# Patient Record
Sex: FEMALE | Race: WHITE | NOT HISPANIC OR LATINO | URBAN - METROPOLITAN AREA
[De-identification: names, ages, dates, MRNs, and addresses within clinical notes are randomized per-mention and may not be internally consistent; named-entity substitution may affect disease eponyms.]

---

## 2022-09-06 ENCOUNTER — SOCIAL WORK (OUTPATIENT)
Dept: BEHAVIORAL/MENTAL HEALTH CLINIC | Facility: CLINIC | Age: 42
End: 2022-09-06

## 2022-09-08 NOTE — PSYCH
ANGER MANAGEMENT GROUP      Session Start Time: 6:00pm    Session End Time: 7:00pm    Duration: 60 minutes    Group summary: This is the 6th session of the anger management group  The topics discussed during today's group included a review of all sessions to date  Participants were asked to share their experiences to-date  Plan:     The group participants will complete the following therapeutic assignments prior to the next group: Continue to contemplate and practice the anger management skills, for repetition is important for cognitive and behavioral change  Individual Summary: Hillary Pierochristie shared that the skills to-date are helping her, and that she seriously does the mindfulness to lower to stress level  No diagnosis found

## 2022-09-13 ENCOUNTER — SOCIAL WORK (OUTPATIENT)
Dept: BEHAVIORAL/MENTAL HEALTH CLINIC | Facility: CLINIC | Age: 42
End: 2022-09-13
Payer: COMMERCIAL

## 2022-09-13 DIAGNOSIS — F12.99 CANNABIS-RELATED DISORDER (HCC): ICD-10-CM

## 2022-09-13 DIAGNOSIS — F17.209 NICOTINE-RELATED DISORDER: ICD-10-CM

## 2022-09-13 DIAGNOSIS — F31.9 BIPOLAR AFFECTIVE DISORDER, REMISSION STATUS UNSPECIFIED (HCC): Primary | ICD-10-CM

## 2022-09-13 DIAGNOSIS — F43.10 POST TRAUMATIC STRESS DISORDER (PTSD): ICD-10-CM

## 2022-09-13 PROCEDURE — 90837 PSYTX W PT 60 MINUTES: CPT | Performed by: SOCIAL WORKER

## 2022-09-13 NOTE — PSYCH
ANGER MANAGEMENT GROUP      Session Start Time: 6:00pm     Session End Time: 7:00pm    Duration: 60 minutes    Group summary: This is the 7th session of the anger management group  The topic for today's session was Conflict Resolution Model and Assertiveness  Passive vs  Aggressive vs  Assertive  Therapist did a role play on having a necessary assertive talk  Session also included DBT Interpersonal Effectiveness DEAR MAN  Plan: Practice assertiveness with family, friends, work associates  Individual response: Summer Jens shared about the positive impact of mindfulness in lowering her anger

## 2022-09-20 ENCOUNTER — SOCIAL WORK (OUTPATIENT)
Dept: BEHAVIORAL/MENTAL HEALTH CLINIC | Facility: CLINIC | Age: 42
End: 2022-09-20
Payer: COMMERCIAL

## 2022-09-20 DIAGNOSIS — F12.99 CANNABIS USE, UNSPECIFIED WITH UNSPECIFIED CANNABIS-INDUCED DISORDER (HCC): ICD-10-CM

## 2022-09-20 DIAGNOSIS — F43.10 POST TRAUMATIC STRESS DISORDER (PTSD): ICD-10-CM

## 2022-09-20 DIAGNOSIS — F31.30 BIPOLAR AFFECTIVE DISORDER, CURRENT EPISODE DEPRESSED, CURRENT EPISODE SEVERITY UNSPECIFIED (HCC): Primary | ICD-10-CM

## 2022-09-20 DIAGNOSIS — F17.209 NICOTINE-RELATED DISORDER: ICD-10-CM

## 2022-09-20 DIAGNOSIS — F31.9 BIPOLAR AFFECTIVE DISORDER, REMISSION STATUS UNSPECIFIED (HCC): Primary | ICD-10-CM

## 2022-09-20 DIAGNOSIS — F43.9 TRAUMA AND STRESSOR-RELATED DISORDER: ICD-10-CM

## 2022-09-20 DIAGNOSIS — F12.99 CANNABIS-RELATED DISORDER (HCC): ICD-10-CM

## 2022-09-20 PROCEDURE — 90837 PSYTX W PT 60 MINUTES: CPT | Performed by: SOCIAL WORKER

## 2022-09-20 PROCEDURE — 90834 PSYTX W PT 45 MINUTES: CPT | Performed by: COUNSELOR

## 2022-09-20 NOTE — BH TREATMENT PLAN
Magui Davidson  1980       Date of Current Treatment Plan: 09/20/22      Diagnosis:   1  Bipolar affective disorder, current episode depressed, current episode severity unspecified ([facility] )     2  Trauma and stressor-related disorder     3  Nicotine-related disorder     4  Cannabis use, unspecified with unspecified cannabis-induced disorder ([facility] )       Symptoms: Client endorsed sadness, guilt, anhedonia, low self-esteem, appetite disturbance, concentration deficit, muscle tension, excessive worry, racing thoughts, sleep disturbance, hypervigilance, exaggerated startle response, disturbing dreams, and elevated energy        Client stated a reduction in sadness, guilt, anhedonia, excessive worry, and racing thoughts  Client conveyed an improvement in self-esteem  Client shared stagnation with appetite disturbance  Client denied hopelessness  Client reported an exacerbation of concentration deficit and muscle tension  Client noted an emergence of sleep disturbance, hypervigilance, exaggerated startle response, disturbing dreams, and elevated energy       Intervention: Positive Pysch and IPT to address bipolar depression and familial relationships  Pharmacotherapy   Anger management group therapy         Long Term Goal: Client reported "I want to focus on my emotional regulation "     Short Term Goal: Improve emotion regulation          Outcome Goal: Client will report being able to regulate her emotions during an interaction with her ex-     Short Term Goal: Better manage anger           Outcome Goal: Client will report use of coping skills to reduce anger when in group settings       Strengths/Personal Resources for Self Care: courageous, strong, resourceful, dedicated to self and family, willingness to better myself, fearless, persistent, confident      Discharge: Client stated "when I am able to regulate my emotions and use my anger in the proper ways "    Importance (1-10): Nakita Silva Treatment Plan [facility]: Diagnosis and Treatment Plan explained to Luci Villalobos relates understanding diagnosis and is agreeable to Treatment Plan

## 2022-09-20 NOTE — PSYCH
ANGER MANAGEMENT GROUP      Session Start Time: 6:00pm    Session End Time: 7:00pm    Duration: 60 minutes      Next Scheduled Group: Weekly    Session #8 - conflict  Based on book The Peace Maker by Nila Cazares  How can I have contributed to this conflict? Empathy for the other person  Scripture from the Bible on conflict  Effectively apologize  The proper way to forgive for mutual healing  Plan: Contemplate today's topics  Individual response: Aurelia Tinoco shared about how this session applies to her conflict with her mother, which impacts her parenting  Encounter Diagnosis     ICD-10-CM    1  Bipolar affective disorder, remission status unspecified ([facility] )  F31 9    2  Post traumatic stress disorder (PTSD)  F43 10    3  Nicotine-related disorder  F17 209    4   Cannabis-related disorder ([facility] 75 )  F12 99

## 2022-09-20 NOTE — PSYCH
Psychotherapy Provided: Individual Psychotherapy 50 minutes     Length of time in session: 50 minutes  4:00pm-4:50pm      Encounter Diagnosis     ICD-10-CM    1  Bipolar affective disorder, current episode depressed, current episode severity unspecified ([facility] 75 )  F31 30    2  Trauma and stressor-related disorder  F43 9    3  Nicotine-related disorder  F17 209    4  Cannabis use, unspecified with unspecified cannabis-induced disorder ([facility] 75 )  F12 99      Client's mood was euthymic and affect was congruent  Session occurred in the office  Client noted she is pleased to have changed her health insurance card to refect her birth last name  Client noted she read the two articles  Mutually completed an updated treatment plan and discussed progress within the past few months  Pedrito Lacy identified progress in the form of making "leaps and bounds" with her self-esteem since April, being less concerned with others' opinions of her, an increase in self-confidence, a reduction in negative intrusive thoughts and associated rumination, and an improvement in self-compassion to where this voice is primary nearly 75% of the time  Assessed her current symptomatology as compared to April 2022  Pedrito House stated a reduction in sadness, guilt, anhedonia, excessive worry, and racing thoughts  Client conveyed an improvement in self-esteem  Client shared stagnation with appetite disturbance  Pedrito House denied hopelessness  Client reported an exacerbation of concentration deficit and muscle tension  Client noted an emergence of sleep disturbance, hypervigilance, exaggerated startle response, disturbing dreams, and elevated energy  Administered a PHQ-9 and she scored a 12 which is a one point decline from her result in April  Conversed on goals pertaining to emotion regulation and to anger management  Client revealed a desire to improve emotion regulation with an outcome of being able to regulate her emotions during an interaction with her ex-   Pedrito BallesterosLacy conveyed she seeks to better manage anger with an outcome of using coping skills to reduce anger when in group settings  Explored her reaction to what she read in the two articles and Louise Smith noted she found them to be "informative " Louise Smith shared she believes bipolar disorder more appropriately fits her experience than a depressive disorder does  Client added she developed awareness in recognizing some of her symptoms can be viably traced back to traumatic experiences she has encountered  Client will complete the seventh and final self-compassion module  Administer an ACE Questionnaire                    Behavioral Health Treatment Plan  [facility]: Diagnosis and Treatment Plan explained to Chavez Landaverde relates understanding diagnosis and is agreeable to Treatment Plan   Yes

## 2022-09-29 ENCOUNTER — SOCIAL WORK (OUTPATIENT)
Dept: BEHAVIORAL/MENTAL HEALTH CLINIC | Facility: CLINIC | Age: 42
End: 2022-09-29
Payer: COMMERCIAL

## 2022-09-29 DIAGNOSIS — F31.30 BIPOLAR AFFECTIVE DISORDER, CURRENT EPISODE DEPRESSED, CURRENT EPISODE SEVERITY UNSPECIFIED (HCC): Primary | ICD-10-CM

## 2022-09-29 DIAGNOSIS — F43.9 TRAUMA AND STRESSOR-RELATED DISORDER: ICD-10-CM

## 2022-09-29 DIAGNOSIS — F17.209 NICOTINE-RELATED DISORDER: ICD-10-CM

## 2022-09-29 PROCEDURE — 90834 PSYTX W PT 45 MINUTES: CPT | Performed by: COUNSELOR

## 2022-09-29 NOTE — PSYCH
Psychotherapy Provided: Individual Psychotherapy 50 minutes     Length of time in session: 50 minutes  4:01pm-4:51pm     Encounter Diagnosis     ICD-10-CM    1  Bipolar affective disorder, current episode depressed, current episode severity unspecified ([facility] )  F31 30    2  Trauma and stressor-related disorder  F43 9    3  Nicotine-related disorder  F17 209      Client's mood was euthymic and affect was congruent  Session occurred in the office  Client noted she completed module 7  Dicussed the difference between the objectives of the self-compassion action plan and the self-compassion maintenance plan  Spoke about her core triggers and physiological warning signs of when her action plan would need to be utilized  Agustina Handy identified triggers as her ex-, her mother, and court proceedings/police interactions  Processed the various strategies she can use and when to optimize her maintenance plan  Client detailed using her appreciation logbook and meditation in the morning while incorporating deep breathing and imagery at bed time can help bookend her days with self-compassion practice  Highlighted her use of coping, such as walking and visualization, as accessible strategies she can utilize when and wherever  Explored her opinion of completing the seven module series on self-compassion and Agustina Handy disclosed much pride and pleasure with her growth in this vital area of her mental wellness  Celebrated the salience of her accomplishment  Administered an ACE Questionnaire and she scored 5 (see scan)  Provided brief psychoed on ACEs and the impact on one's health outcomes  Gave her a copy of two workbook chapters titled Toxic Stress and Adverse Childhood Experiences, and How ACEs Harm the Body: The Mechanisms  Client will complete the workbook chapters                   Behavioral Health Treatment Plan [facility]: Diagnosis and Treatment Plan explained to Ailyn Montelongo relates understanding diagnosis and is agreeable to Treatment Plan   Yes

## 2022-10-03 ENCOUNTER — TELEMEDICINE (OUTPATIENT)
Dept: PSYCHIATRY | Facility: CLINIC | Age: 42
End: 2022-10-03
Payer: COMMERCIAL

## 2022-10-03 DIAGNOSIS — F43.10 POST TRAUMATIC STRESS DISORDER (PTSD): ICD-10-CM

## 2022-10-03 DIAGNOSIS — F31.81 BIPOLAR II DISORDER (HCC): Primary | ICD-10-CM

## 2022-10-03 PROCEDURE — 99213 OFFICE O/P EST LOW 20 MIN: CPT | Performed by: PSYCHIATRY & NEUROLOGY

## 2022-10-03 RX ORDER — ESCITALOPRAM OXALATE 20 MG/1
20 TABLET ORAL DAILY
Qty: 30 TABLET | Refills: 1 | Status: SHIPPED | OUTPATIENT
Start: 2022-10-03

## 2022-10-03 RX ORDER — ARIPIPRAZOLE 2 MG/1
4 TABLET ORAL DAILY
Qty: 60 TABLET | Refills: 1 | Status: SHIPPED | OUTPATIENT
Start: 2022-10-03

## 2022-10-03 RX ORDER — ESCITALOPRAM OXALATE 20 MG/1
20 TABLET ORAL DAILY
COMMUNITY
Start: 2022-08-03 | End: 2022-10-03 | Stop reason: SDUPTHER

## 2022-10-03 RX ORDER — HYDROXYZINE HYDROCHLORIDE 10 MG/1
10 TABLET, FILM COATED ORAL DAILY PRN
COMMUNITY
Start: 2022-08-01

## 2022-10-03 RX ORDER — ARIPIPRAZOLE 2 MG/1
4 TABLET ORAL DAILY
COMMUNITY
Start: 2022-08-18 | End: 2022-10-03 | Stop reason: SDUPTHER

## 2022-10-03 NOTE — PSYCH
Virtual Regular Visit    Verification of patient location:    Patient is located in the following state in which I hold an active license [place]      Assessment/Plan:  Continue current meds    Problem List Items Addressed This Visit    None     Visit Diagnoses     Bipolar II disorder ([facility] )    -  Primary    Relevant Medications    hydrOXYzine HCL (ATARAX) 10 mg tablet    ARIPiprazole (ABILIFY) 2 mg tablet    escitalopram (LEXAPRO) 20 mg tablet    Post traumatic stress disorder (PTSD)        Relevant Medications    hydrOXYzine HCL (ATARAX) 10 mg tablet    ARIPiprazole (ABILIFY) 2 mg tablet    escitalopram (LEXAPRO) 20 mg tablet          Goals addressed in session: Goal 1          Reason for visit is   Chief Complaint   Patient presents with    Medication Management    Depression        Encounter provider Jimena Bean MD    Provider located at [id number] [facility]  [address]  [phone]      Recent Visits  No visits were found meeting these conditions  Showing recent visits within past 7 days and meeting all other requirements  Today's Visits  Date Type Provider Dept   10/03/22 Telemedicine Jimena Bean, [address] [place] [address] today's visits and meeting all other requirements  Future Appointments  No visits were found meeting these conditions  Showing future appointments within next 150 days and meeting all other requirements       The patient was identified by name and date of birth  Erin Hill was informed that this is a telemedicine visit and that [facility] and patient was informed this is a secure, HIPAA-complaint platform  She agrees to proceed     My office door was closed  No one else was in the room  She acknowledged consent and understanding of privacy and security of the video platform   The patient has agreed to participate and understands they can discontinue the visit at any time  The audio connection was very poor, I had to use my phone as well  Patient is aware this is a billable service  Subjective  Jim Nguyen is a 38 y o  female with depression and anxiety presents for regular f/u   Compliant with meds; SE increased appetite - pt is on a healthy diet; lost 3 lbs  Mood - improved - better energy and motivation; not depressed  More social and active  Denies manic spx  Anxiety - " regular " worries; much less intrusive negative thoughts  Rarely takes atarax prn  Back pain exacerbation - seen by PCP - on steroids; xray done  Family stress      HPI     Past Medical History:   Diagnosis Date    Chlamydia     Depression     stable; in counseling for years     Papanicolaou smear 2019       Past Surgical History:   Procedure Laterality Date    BACK SURGERY  2019    discectomy L5-S1      SECTION      x3       Current Outpatient Medications   Medication Sig Dispense Refill    ARIPiprazole (ABILIFY) 2 mg tablet Take 2 tablets (4 mg total) by mouth daily 60 tablet 1    escitalopram (LEXAPRO) 20 mg tablet Take 1 tablet (20 mg total) by mouth daily 30 tablet 1    fexofenadine (ALLEGRA) 60 MG tablet Take 1 tablet by mouth daily as needed      fluticasone (FLONASE) 50 mcg/act nasal spray 1 spray into each nostril daily      hydrOXYzine HCL (ATARAX) 10 mg tablet Take 10 mg by mouth daily as needed      Levonorgestrel (LILETTA, 52 MG, IU) by Intrauterine route      Magnesium 300 MG CAPS Take 1 capsule by mouth daily      Multiple Vitamins-Minerals (MULTI COMPLETE PO) Take 1 tablet by mouth daily       No current facility-administered medications for this visit  Allergies   Allergen Reactions    Penicillins Hives    Sulfa Antibiotics Hives       Review of Systems   Constitutional: Positive for appetite change (increased)  Negative for activity change  Psychiatric/Behavioral: Negative for sleep disturbance and suicidal ideas         Video Exam    There were no vitals filed for this visit  Physical Exam  Constitutional:       Appearance: Normal appearance  She is normal weight  Neurological:      Mental Status: She is alert  Psychiatric:         Attention and Perception: Attention and perception normal          Mood and Affect: Mood and affect normal          Speech: Speech normal          Behavior: Behavior normal  Behavior is cooperative  Thought Content:  Thought content normal          Cognition and Memory: Cognition and memory normal           I spent 15 minutes directly with the patient during this visit

## 2022-10-04 ENCOUNTER — DOCUMENTATION (OUTPATIENT)
Dept: PSYCHIATRY | Facility: CLINIC | Age: 42
End: 2022-10-04

## 2022-10-11 ENCOUNTER — SOCIAL WORK (OUTPATIENT)
Dept: BEHAVIORAL/MENTAL HEALTH CLINIC | Facility: CLINIC | Age: 42
End: 2022-10-11
Payer: COMMERCIAL

## 2022-10-11 ENCOUNTER — TELEPHONE (OUTPATIENT)
Dept: PSYCHIATRY | Facility: CLINIC | Age: 42
End: 2022-10-11

## 2022-10-11 DIAGNOSIS — F43.9 TRAUMA AND STRESSOR-RELATED DISORDER: ICD-10-CM

## 2022-10-11 DIAGNOSIS — F12.99 CANNABIS USE, UNSPECIFIED WITH UNSPECIFIED CANNABIS-INDUCED DISORDER (HCC): ICD-10-CM

## 2022-10-11 DIAGNOSIS — F31.30 BIPOLAR AFFECTIVE DISORDER, CURRENT EPISODE DEPRESSED, CURRENT EPISODE SEVERITY UNSPECIFIED (HCC): Primary | ICD-10-CM

## 2022-10-11 DIAGNOSIS — F12.99 CANNABIS-RELATED DISORDER (HCC): ICD-10-CM

## 2022-10-11 DIAGNOSIS — F31.9 BIPOLAR AFFECTIVE DISORDER, REMISSION STATUS UNSPECIFIED (HCC): ICD-10-CM

## 2022-10-11 DIAGNOSIS — F17.209 NICOTINE-RELATED DISORDER: ICD-10-CM

## 2022-10-11 DIAGNOSIS — F43.10 POST TRAUMATIC STRESS DISORDER (PTSD): ICD-10-CM

## 2022-10-11 PROCEDURE — 90853 GROUP PSYCHOTHERAPY: CPT | Performed by: SOCIAL WORKER

## 2022-10-11 NOTE — PSYCH
ANGER MANAGEMENT GROUP      Session Start Time: 6:00pm    Session End Time: 7:00pm    Duration: 60 minutes    Group summary: This is #10 session of the anger management group  The topics discussed during today's group included #9 anger and the family, review of all materials, anger group final exam, completion of individual anger management plan  Plan: This has been a 10 week program  It takes 6 months to change cognitions and behaviors so continue reviewing notes and especially individual anger management plan  Keep practicing  This was the graduation! Individual Summary: Jennifer Nunez has shared a lot about her childhood experiences and being committed to breaking the pattern by treating her children better  Encounter Diagnosis     ICD-10-CM    1  Bipolar affective disorder, current episode depressed, current episode severity unspecified ([place] [address] )  F31 30    2  Trauma and stressor-related disorder  F43 9    3  Nicotine-related disorder  F17 209    4  Cannabis use, unspecified with unspecified cannabis-induced disorder ([facility] )  F12 99    5  Bipolar affective disorder, remission status unspecified ([place] [address] )  F31 9    6  Post traumatic stress disorder (PTSD)  F43 10    7   Cannabis-related disorder ([facility] 75 )  F12 99

## 2022-10-20 ENCOUNTER — TELEPHONE (OUTPATIENT)
Dept: BEHAVIORAL/MENTAL HEALTH CLINIC | Facility: CLINIC | Age: 42
End: 2022-10-20